# Patient Record
(demographics unavailable — no encounter records)

---

## 2024-12-04 NOTE — PHYSICAL EXAM
[de-identified] : Knee ranges 5-115 degrees with pain and crepitus stable to varus, valgus, anterior and posterior stress neurovascularly intact distally no pain with hip range of motion negative straight leg raise no effusion, synovitis, or edema or erythema skin intact [de-identified] : 4 views right knee ap/pa/lat/sunride show bone on bone under patella

## 2024-12-04 NOTE — HISTORY OF PRESENT ILLNESS
[de-identified] : Patient comes in with more than 6 months of right knee pain atraumatic in onset.  Patient has tried greater than 6 months of nsaids, physical therapy and doctor directed exercises and injections.  Patient continues to have pain that is 8/10 in severity and interferes with activities of daily living such as putting on shoes and socks, walking two blocks, and getting up and down from a chair and toilet.  Knee osteoarthritis outcome score is 8.1

## 2024-12-04 NOTE — PROCEDURE
[de-identified] : patients wants a cortisone injection.  risks, benefits, and alternatives discussed and patient gave consent.  under sterile conditions using the anterolateral portal i injected 80mg of depomedrol and 2cc of 0.25% marcaine.  patient tolerated procedure well and will rest and ice and fu when injection wears off. right knee